# Patient Record
Sex: FEMALE | NOT HISPANIC OR LATINO | Employment: FULL TIME | URBAN - NONMETROPOLITAN AREA
[De-identification: names, ages, dates, MRNs, and addresses within clinical notes are randomized per-mention and may not be internally consistent; named-entity substitution may affect disease eponyms.]

---

## 2022-02-28 DIAGNOSIS — M25.562 LEFT KNEE PAIN, UNSPECIFIED CHRONICITY: Primary | ICD-10-CM

## 2022-02-28 PROBLEM — N76.0 BACTERIAL VAGINOSIS: Status: ACTIVE | Noted: 2022-02-28

## 2022-02-28 PROBLEM — N39.0 URINARY TRACT INFECTIOUS DISEASE: Status: ACTIVE | Noted: 2022-02-28

## 2022-02-28 PROBLEM — H60.90 OTITIS EXTERNA: Status: ACTIVE | Noted: 2022-02-28

## 2022-02-28 PROBLEM — R10.9 ABDOMINAL PAIN: Status: ACTIVE | Noted: 2022-02-28

## 2022-02-28 PROBLEM — K22.2 STRICTURE OF ESOPHAGUS: Status: ACTIVE | Noted: 2022-02-28

## 2022-02-28 PROBLEM — H66.92 ACUTE LEFT OTITIS MEDIA: Status: ACTIVE | Noted: 2022-02-28

## 2022-02-28 PROBLEM — M54.50 LOW BACK PAIN: Status: ACTIVE | Noted: 2022-02-28

## 2022-02-28 PROBLEM — B96.81 GASTRITIS DUE TO HELICOBACTER SPECIES: Status: ACTIVE | Noted: 2022-02-28

## 2022-02-28 PROBLEM — B96.89 BACTERIAL VAGINOSIS: Status: ACTIVE | Noted: 2022-02-28

## 2022-02-28 PROBLEM — E78.00 HYPERCHOLESTEROLEMIA: Status: ACTIVE | Noted: 2022-02-28

## 2022-02-28 PROBLEM — K29.70 GASTRITIS DUE TO HELICOBACTER SPECIES: Status: ACTIVE | Noted: 2022-02-28

## 2022-03-02 ENCOUNTER — OFFICE VISIT (OUTPATIENT)
Dept: ORTHOPEDIC SURGERY | Facility: CLINIC | Age: 47
End: 2022-03-02

## 2022-03-02 ENCOUNTER — HOSPITAL ENCOUNTER (OUTPATIENT)
Dept: GENERAL RADIOLOGY | Facility: HOSPITAL | Age: 47
Discharge: HOME | End: 2022-03-02
Admitting: PHYSICIAN ASSISTANT

## 2022-03-02 VITALS — TEMPERATURE: 98.6 F | BODY MASS INDEX: 31.65 KG/M2 | WEIGHT: 190 LBS | HEIGHT: 65 IN

## 2022-03-02 DIAGNOSIS — G89.29 CHRONIC PAIN OF LEFT KNEE: Primary | ICD-10-CM

## 2022-03-02 DIAGNOSIS — M25.562 CHRONIC PAIN OF LEFT KNEE: Primary | ICD-10-CM

## 2022-03-02 DIAGNOSIS — M25.562 LEFT KNEE PAIN, UNSPECIFIED CHRONICITY: ICD-10-CM

## 2022-03-02 PROCEDURE — 73562 X-RAY EXAM OF KNEE 3: CPT

## 2022-03-02 PROCEDURE — 99204 OFFICE O/P NEW MOD 45 MIN: CPT | Performed by: PHYSICIAN ASSISTANT

## 2022-03-02 RX ORDER — MELOXICAM 15 MG/1
TABLET ORAL
Qty: 90 TABLET | Refills: 1 | Status: SHIPPED | OUTPATIENT
Start: 2022-03-02

## 2022-03-02 NOTE — PROGRESS NOTES
"Chief Complaint  Pain and Establish Care of the Left Knee    Subjective    History of Present Illness      Kymberly Terry is a 46 y.o. female who presents to [facility] for complaint of Knee Pain:   Patient complains of left knee pain.   The pain began 7 months ago.   The pain is located over medial, patellar aspect.    She states the pain started when she was stepping out of the bus ([employer]) and twisted wrong.  She describes the symptoms as stabbing.   Symptoms improve with rest.   She is experiencing swelling, primarily in the back of the knee.  The symptoms are worse with standing and walking.   The knee has given out or felt unstable.   The patient can bend and straighten the knee fully but it is painful.    She has been using ibuprofen for the last month with some relief but she is taking it every 4-6 hours.  She did initially see Dr. Durham but he wanted her to do PT and did not offer much else.      Objective   Vital Signs:   Temp 98.6 °F (37 °C)   Ht 165.1 cm (65\")   Wt 86.2 kg (190 lb)   BMI 31.62 kg/m²     Physical Exam  Vitals signs and nursing note reviewed.   Constitutional:       Appearance: Normal appearance.   Pulmonary:      Effort: Pulmonary effort is normal.   Skin:     General: Skin is warm and dry.      Capillary Refill: Capillary refill takes less than 2 seconds.   Neurological:      General: No focal deficit present.      Mental Status: He is alert and oriented to person, place, and time. Mental status is at baseline.   Psychiatric:         Mood and Affect: Mood normal.         Behavior: Behavior normal.         Thought Content: Thought content normal.         Judgment: Judgment normal.     Ortho Exam   LEFT knee  Positive for effusion of the knee joint and tenderness with palpation of the medial meniscus.        Positive for pain with full extension.  Positive for mild effusion.  Range of motion-extension to flexion: 0-110 degrees.  Positive Apley's grinding test " over the joint line.   Positive Akanksha's.   Negative for instability on medial or lateral testing.   Anterior and posterior drawer testing is negative.   Lachman test is negative.  The dorsalis pedis and posterior tibial artery pulses are palpable. Common peroneal nerve function is well preserved.  Gait is cautious and somewhat antalgic.       Result Review :   Radiologic studies - see below for interpretation  LEFT knee xrays  weightbearing/standing 3 views were ordered by Kishan Ball PA-C. Performed at [facility] on 3/2/2022. Images were independently viewed and interpreted by myself, my impression as follows:  Findings: Mild medial compartment and patellofemoral arthrosis, lateral compartment affect appears fairly preserved.  There is a metallic foreign body noted at the distal thigh, this is linear and just superior to the femur  Bony lesion: no  Soft tissues: increased  Joint spaces: subnormal  Hardware appropriately positioned: not applicable  Prior studies available for comparison: yes           PROCEDURE  Procedures           Assessment   Assessment and Plan    Problem List Items Addressed This Visit        Musculoskeletal and Injuries    Chronic pain of left knee - Primary    Relevant Medications    meloxicam (MOBIC) 15 MG tablet    Other Relevant Orders    MRI Knee Left Without Contrast          Follow Up   · Discussion of any imaging in detail. Discussion of orthopaedic goals.  · Risk, benefits, and merits of treatment alternatives reviewed with the patient. Treatment alternatives include: intra-articular steroid injection, bracing and further imaging/testing.  We will proceed with bracing but will hold off with any type of injection in case MRI shows something surgical.  I am most interested in seeing if there is a meniscal injury at the medial aspect of the knee and the cartilage of the knee cap.  · Ice, heat, and/or modalities as beneficial  · To schedule MRI of Left  knee to evaluate the articular cartilage and to evaluate for possible meniscal injury  · Patient is encouraged to call or return for any issues or concerns.  · Hinged knee brace fitted in office today  · Follow up will be based on results of MRI  • Patient was given instructions and counseling regarding her condition or for health maintenance advice. Please see specific information pulled into the AVS if appropriate.     Kishan Ball PA-C   Date of Encounter: 3/2/2022   Electronically signed by Kishan Ball PA-C, 03/02/22, 5:59 AM EST.     EMR Dragon/Transcription disclaimer:  Much of this encounter note is an electronic transcription/translation of spoken language to printed text. The electronic translation of spoken language may permit erroneous, or at times, nonsensical words or phrases to be inadvertently transcribed; Although I have reviewed the note for such errors, some may still exist.

## 2022-03-03 ENCOUNTER — PATIENT ROUNDING (BHMG ONLY) (OUTPATIENT)
Dept: ORTHOPEDIC SURGERY | Facility: CLINIC | Age: 47
End: 2022-03-03

## 2022-03-03 NOTE — PROGRESS NOTES
March 3, 2022    A [employer] message has been sent to the patient for PATIENT ROUNDING with [facility]

## 2022-03-30 ENCOUNTER — HOSPITAL ENCOUNTER (OUTPATIENT)
Dept: MRI IMAGING | Facility: HOSPITAL | Age: 47
End: 2022-03-30

## 2022-04-28 ENCOUNTER — APPOINTMENT (OUTPATIENT)
Dept: MRI IMAGING | Facility: HOSPITAL | Age: 47
End: 2022-04-28

## 2022-08-18 ENCOUNTER — TELEPHONE (OUTPATIENT)
Dept: ORTHOPEDIC SURGERY | Facility: CLINIC | Age: 47
End: 2022-08-18

## 2022-08-18 DIAGNOSIS — G89.29 CHRONIC PAIN OF LEFT KNEE: Primary | ICD-10-CM

## 2022-08-18 DIAGNOSIS — M25.562 LEFT KNEE PAIN, UNSPECIFIED CHRONICITY: ICD-10-CM

## 2022-08-18 DIAGNOSIS — M25.562 CHRONIC PAIN OF LEFT KNEE: Primary | ICD-10-CM

## 2022-08-18 NOTE — TELEPHONE ENCOUNTER
Caller: BENJI WADE    Relationship: SELF    Best call back number:[phone]    What orders are you requesting (i.e. lab or imaging): MRI OF LEFT KNEE    In what timeframe would the patient need to come in: ASAP    Where will you receive your lab/imaging services: ???    Additional notes: PATIENT NEEDS TO REQ NEW ORDERS FOR MRI- HOSPITAL IS NOT ABLE TO LOCATE IN SYSTEM DUE TO TIME FRAME OF ORIGINAL

## 2022-08-25 NOTE — TELEPHONE ENCOUNTER
PATIENT CALLED BACK REGARDING MRI ORDER.  SHE SPOKE WITH THE HOSPITAL AND THE CURRENT ORDER FROM [facility] CANCELED.  PLEASE ENTER NEW ORDER FOR MRI

## 2022-09-09 ENCOUNTER — HOSPITAL ENCOUNTER (OUTPATIENT)
Dept: MRI IMAGING | Facility: HOSPITAL | Age: 47
Discharge: HOME | End: 2022-09-09
Admitting: PHYSICIAN ASSISTANT

## 2022-09-09 DIAGNOSIS — M25.562 LEFT KNEE PAIN, UNSPECIFIED CHRONICITY: ICD-10-CM

## 2022-09-09 PROCEDURE — 73721 MRI JNT OF LWR EXTRE W/O DYE: CPT

## 2022-09-12 ENCOUNTER — TELEPHONE (OUTPATIENT)
Dept: ORTHOPEDIC SURGERY | Facility: CLINIC | Age: 47
End: 2022-09-12

## 2022-09-13 DIAGNOSIS — G89.29 CHRONIC PAIN OF LEFT KNEE: Primary | ICD-10-CM

## 2022-09-13 DIAGNOSIS — M25.562 CHRONIC PAIN OF LEFT KNEE: Primary | ICD-10-CM

## 2022-09-21 ENCOUNTER — TRANSCRIBE ORDERS (OUTPATIENT)
Dept: ADMINISTRATIVE | Facility: HOSPITAL | Age: 47
End: 2022-09-21

## 2022-09-21 DIAGNOSIS — M25.562 CHRONIC PAIN OF LEFT KNEE: Primary | ICD-10-CM

## 2022-09-21 DIAGNOSIS — G89.29 CHRONIC PAIN OF LEFT KNEE: Primary | ICD-10-CM

## 2022-09-28 ENCOUNTER — HOSPITAL ENCOUNTER (OUTPATIENT)
Dept: CT IMAGING | Facility: HOSPITAL | Age: 47
Discharge: HOME | End: 2022-09-28

## 2022-09-28 ENCOUNTER — HOSPITAL ENCOUNTER (OUTPATIENT)
Dept: INTERVENTIONAL RADIOLOGY/VASCULAR | Facility: HOSPITAL | Age: 47
Discharge: HOME | End: 2022-09-28

## 2022-09-28 DIAGNOSIS — M25.562 CHRONIC PAIN OF LEFT KNEE: ICD-10-CM

## 2022-09-28 DIAGNOSIS — G89.29 CHRONIC PAIN OF LEFT KNEE: ICD-10-CM

## 2022-09-28 PROCEDURE — 77002 NEEDLE LOCALIZATION BY XRAY: CPT

## 2022-09-28 PROCEDURE — 25010000002 IOPAMIDOL 61 % SOLUTION: Performed by: ORTHOPAEDIC SURGERY

## 2022-09-28 PROCEDURE — 73701 CT LOWER EXTREMITY W/DYE: CPT

## 2022-09-28 RX ORDER — LIDOCAINE HYDROCHLORIDE 20 MG/ML
20 INJECTION, SOLUTION INFILTRATION; PERINEURAL ONCE
Status: COMPLETED | OUTPATIENT
Start: 2022-09-28 | End: 2022-09-28

## 2022-09-28 RX ORDER — SODIUM CHLORIDE 9 MG/ML
10 INJECTION INTRAVENOUS
Status: COMPLETED | OUTPATIENT
Start: 2022-09-28 | End: 2022-09-28

## 2022-09-28 RX ADMIN — IOPAMIDOL 15 ML: 612 INJECTION, SOLUTION INTRATHECAL at 11:20

## 2022-09-28 RX ADMIN — LIDOCAINE HYDROCHLORIDE 9 ML: 20 INJECTION, SOLUTION INFILTRATION; PERINEURAL at 11:15

## 2022-09-28 RX ADMIN — SODIUM BICARBONATE 1 MEQ: 84 INJECTION, SOLUTION INTRAVENOUS at 11:15

## 2022-09-28 RX ADMIN — SODIUM CHLORIDE 10 ML: 9 INJECTION, SOLUTION INTRAMUSCULAR; INTRAVENOUS; SUBCUTANEOUS at 11:20

## 2022-10-03 ENCOUNTER — OFFICE VISIT (OUTPATIENT)
Dept: ORTHOPEDIC SURGERY | Facility: CLINIC | Age: 47
End: 2022-10-03

## 2022-10-03 VITALS — WEIGHT: 209.4 LBS | TEMPERATURE: 98.4 F | BODY MASS INDEX: 33.65 KG/M2 | HEIGHT: 66 IN

## 2022-10-03 DIAGNOSIS — M17.12 PRIMARY OSTEOARTHRITIS OF LEFT KNEE: Primary | ICD-10-CM

## 2022-10-03 PROCEDURE — 99213 OFFICE O/P EST LOW 20 MIN: CPT | Performed by: ORTHOPAEDIC SURGERY

## 2022-10-03 NOTE — PROGRESS NOTES
"Chief Complaint  Follow-up of the Left Knee    Subjective    History of Present Illness      Kymberly Terry is a 47 y.o. female who presents to [facility] for persistent left knee pain and discomfort.  History of Present Illness this patient states that she has done a little bit better since the last visit to the office.  Most of the pain is on the medial aspect of the knee.  She has difficulty squatting on the ground and difficulty going up and down the steps.  Occasionally by the end of the day she does have effusion and swelling of the knee.  She would like to try conservative, nonoperative management at this point and I certainly agree with trying steroid injections and even viscosupplementation prior to any type of surgical replacement of the knee because she is only 47 years of age.  Pain Location:  LEFT knee  Radiation: none  Quality: dull  Intensity/Severity: moderate  Duration: August 2021  Progression of symptoms: no worsening, symptoms stable/unchanged  Onset quality: sudden, she was stepping out of the bus ([employer]) when she twisted wrong   Timing: intermittent  Aggravating Factors: walking, standing  Alleviating Factors: NSAIDs, rest, brace  Previous Episodes: yes  Associated Symptoms: pain, swelling, clicking/popping  ADLs Affected: ambulating  Previous Treatment: NSAIDs       Objective   Vital Signs:   Temp 98.4 °F (36.9 °C)   Ht 167.6 cm (66\")   Wt 95 kg (209 lb 6.4 oz)   BMI 33.80 kg/m²     Physical Exam  Physical Exam  Vitals signs and nursing note reviewed.   Constitutional:       Appearance: Normal appearance.   Pulmonary:      Effort: Pulmonary effort is normal.   Skin:     General: Skin is warm and dry.      Capillary Refill: Capillary refill takes less than 2 seconds.   Neurological:      General: No focal deficit present.      Mental Status: He is alert and oriented to person, place, and time. Mental status is at baseline.   Psychiatric:         Mood and Affect: " Mood normal.         Behavior: Behavior normal.         Thought Content: Thought content normal.         Judgment: Judgment normal.     Ortho Exam   Left knee (varus). Patient has crepitus throughout range of motion. Positive patellar grind test. Mild effusion. Lachman is negative. Pivot shift is negative. Anterior and posterior drawer signs are negative. Significant joint line tenderness is noted on the medial aspect of the knee. Patient has a varus orientation of the knee. There is fullness and tenderness in the Popliteal fossa. Mild distention of a Popliteal cyst is noted in this location. Range of motion in flexion is from 0-110 degrees. Neurovascular status is intact.  Dorsalis pedis and posterior tibial artery pulses are palpable. Common peroneal nerve function is well preserved. Patient's gait is cautious and antalgic. Skin and soft tissues are mildly swollen, consistent with synovitis and effusion. The patient has a significant limp with the first few steps after starting the gait cycle. Getting out of a chair takes a lot of effort due to pain on knee flexion.         Result Review :   The following data was reviewed by: Bean Turpin MD on 10/03/2022:  Radiologic studies - see below for interpretation  Reviewed CT Arthrogram report of left knee, performed at [facility] on 09/28/2022, summary of impression below:    Findings of the CT arthrogram are available in the office today.  These images are discussed with the patient.  No meniscal tear is identified.  There is grade III chondromalacia along the weightbearing surface of the lateral femoral condyle.  There is also grade III chondromalacia along the weightbearing aspect of the medial femoral condyle.  The ACL appears to be intact.  The osteoarthritic changes are noted along the superior and inferior pole of the patella.  There is a 4.2 x 2.8 cm popliteal cyst noted.        Procedures           Assessment   Assessment and Plan     Diagnoses and all orders for this visit:    1. Primary osteoarthritis of left knee (Primary)  -     Visco Treatment; Future          Follow Up   · Compression/brace of the knee to prevent it from buckling and giving her.  · Calcium and vitamin D for bone health.  · Glucosamine, chondroitin and turmeric for cartilage health.  · Intra-articular steroid injections and viscosupplementation injections discussed with the patient at length and she understands and accepts that.  We will have to see if the insurance company will approve those injections.  · Tablet meloxicam 7.5 mg tab 1 p.o. nightly for pain swelling and discomfort.  · Rest, ice, compression, and elevation (RICE) therapy  · Stretching and strengthening exercises  · OTC Alternate Ibuprofen and Tylenol as needed  · Follow up in 4 week(s) or once Visco Supplementation has been approved by the insurance company   • Patient was given instructions and counseling regarding her condition or for health maintenance advice. Please see specific information pulled into the AVS if appropriate.     Bean Turpin MD   Date of Encounter: 10/3/2022   Electronically signed by Bean Turpin MD, 10/03/22, 9:20 AM EDT.     EMR Dragon/Transcription disclaimer:  Much of this encounter note is an electronic transcription/translation of spoken language to printed text. The electronic translation of spoken language may permit erroneous, or at times, nonsensical words or phrases to be inadvertently transcribed; Although I have reviewed the note for such errors, some may still exist.

## 2022-10-07 ENCOUNTER — TELEPHONE (OUTPATIENT)
Dept: ORTHOPEDIC SURGERY | Facility: CLINIC | Age: 47
End: 2022-10-07

## 2022-10-07 NOTE — TELEPHONE ENCOUNTER
Caller: BENJI    Relationship to patient: SELF     Best call back number: [phone]    Patient is needing: PT CALLED IN TO SEE IF OFFICE WAS ABLE TO GET AUTH FOR GEL INJECTIONS FOR L KNEE , SHE ALSO STATED IF INSURANCE DOES NOT AUTH THIS THAT SHE WOULD LIKE TO KNOW HOW MUCH IT IS OUT OF POCKET. PLEASE ADVISE

## 2022-10-10 NOTE — TELEPHONE ENCOUNTER
Spoke to pt. She is interested in information about the discount pharmacy, I will get info for her.

## 2022-10-15 PROBLEM — M17.12 PRIMARY OSTEOARTHRITIS OF LEFT KNEE: Status: ACTIVE | Noted: 2022-10-15

## 2024-04-26 ENCOUNTER — OFFICE VISIT (OUTPATIENT)
Dept: SURGERY | Facility: CLINIC | Age: 49
End: 2024-04-26

## 2024-04-26 VITALS
HEART RATE: 111 BPM | DIASTOLIC BLOOD PRESSURE: 98 MMHG | SYSTOLIC BLOOD PRESSURE: 142 MMHG | WEIGHT: 210.8 LBS | BODY MASS INDEX: 33.88 KG/M2 | HEIGHT: 66 IN

## 2024-04-26 DIAGNOSIS — L02.416 ABSCESS OF LEFT THIGH: Primary | ICD-10-CM

## 2024-04-26 DIAGNOSIS — Z72.0 TOBACCO USE: ICD-10-CM

## 2024-04-26 LAB — GRAM STN SPEC: NORMAL

## 2024-04-26 PROCEDURE — 87205 SMEAR GRAM STAIN: CPT | Performed by: STUDENT IN AN ORGANIZED HEALTH CARE EDUCATION/TRAINING PROGRAM

## 2024-04-26 RX ORDER — METFORMIN HYDROCHLORIDE 500 MG/1
500 TABLET, EXTENDED RELEASE ORAL
COMMUNITY
Start: 2024-04-18

## 2024-04-26 RX ORDER — CEPHALEXIN 500 MG/1
500 CAPSULE ORAL 3 TIMES DAILY
COMMUNITY
Start: 2024-04-18

## 2024-04-26 RX ORDER — SULFAMETHOXAZOLE AND TRIMETHOPRIM 800; 160 MG/1; MG/1
1 TABLET ORAL 2 TIMES DAILY
Qty: 14 TABLET | Refills: 0 | Status: SHIPPED | OUTPATIENT
Start: 2024-04-26

## 2024-04-26 NOTE — PROGRESS NOTES
Patient Name:  Kymberly Terry  YOB: 1975  [id number]    Referring Provider: Silver Ruelas*    Patient Care Team:  Jennifer Ruelas APRN as PCP - General (Nurse Practitioner)      Chief Complaint  Cyst (LEFT INNER THIGH)    Subjective     Kymberly Terry is a 49 y.o. female who presents to [facility]    History of Present Illness  49-year-old female who presents today as a new referral for an inner left thigh abscess.  Patient states she first noticed the lesion over 1 week ago and had progressively gotten larger.  She was evaluated by her primary care provider who prescribed her an outpatient course of Keflex which she says has not significantly helped the pain she has had but she thinks the lesion has gotten somewhat smaller.  In the past she has had similar appearing lesions that have spontaneously drained and resolved without intervention.  She denies any nausea, vomiting, fevers, chills.  Cyst        History     Past Medical History:   Diagnosis Date    Diabetes mellitus     Knee swelling 12/19    Tear of meniscus of knee Surgery on right January 2020 for torn meniscus       Past Surgical History:   Procedure Laterality Date    APPENDECTOMY  2008    DR. THOMAS/DAVID    GALLBLADDER SURGERY  2000    DR. DEL ANGEL/LEBANON    HYSTERECTOMY  11/2006    PARTIAL/DR. ROTH/ROLANDO    KNEE ARTHROSCOPY Right 2020    DR. HINTON/DAVID    LAPAROSCOPIC TUBAL LIGATION  11/2006    DR. ROTH/ROLANDO       Family History   Problem Relation Age of Onset    Diabetes Mother     Lung cancer Father        Social History     Tobacco Use    Smoking status: Every Day     Current packs/day: 1.50     Average packs/day: 1.5 packs/day for 25.0 years (37.5 ttl pk-yrs)     Types: Cigarettes    Smokeless tobacco: Never   Vaping Use    Vaping status: Never Used   Substance Use Topics    Alcohol use: Never    Drug use: Never       No Known  "Allergies    Prior to Admission medications    Medication Sig Start Date End Date Taking? Authorizing Provider   cephalexin (KEFLEX) 500 MG capsule Take 1 capsule by mouth 3 (Three) Times a Day. 4/18/24  Yes Provider, MD Moo   metFORMIN ER (GLUCOPHAGE-XR) 500 MG 24 hr tablet Take 1 tablet by mouth Daily With Breakfast. 4/18/24  Yes Provider, MD Moo   meloxicam (MOBIC) 15 MG tablet 1 PO Daily with food.  Patient not taking: Reported on 4/26/2024 3/2/22   Kishan Ball PA-C   sulfamethoxazole-trimethoprim (Bactrim DS) 800-160 MG per tablet Take 1 tablet by mouth 2 (Two) Times a Day. 4/26/24   Chris Esparza MD       Objective    Objective              Vital Signs:   /98 (BP Location: Left arm, Patient Position: Sitting, Cuff Size: Adult)   Pulse 111   Ht 167.6 cm (66\")   Wt 95.6 kg (210 lb 12.8 oz)   BMI 34.02 kg/m²       Physical Exam  Exam conducted with a chaperone present.   Constitutional:       Appearance: Normal appearance.   HENT:      Head: Normocephalic and atraumatic.      Mouth/Throat:      Mouth: Mucous membranes are moist.      Pharynx: Oropharynx is clear.   Cardiovascular:      Rate and Rhythm: Normal rate and regular rhythm.   Pulmonary:      Effort: Pulmonary effort is normal. No respiratory distress.   Abdominal:      General: There is no distension.      Palpations: Abdomen is soft.      Tenderness: There is no abdominal tenderness.   Musculoskeletal:         General: No swelling. Normal range of motion.      Cervical back: Normal range of motion and neck supple.      Comments: Approximately 4 cm inner left thigh abscess with fluctuance and surrounding erythema   Skin:     General: Skin is warm and dry.   Neurological:      General: No focal deficit present.      Mental Status: She is alert and oriented to person, place, and time.   Psychiatric:         Mood and Affect: Mood normal.         Behavior: Behavior normal.          Incision & Drainage    Date/Time: " 4/26/2024 3:25 PM    Performed by: Chris Esparza MD  Authorized by: Chris Esparza MD  Type: abscess  Body area: lower extremity  Location details: left leg    Anesthesia:  Local Anesthetic: lidocaine 1% with epinephrine  Anesthetic total: 10 mL    Sedation:  Patient sedated: no    Scalpel size: 15  Incision type: Cruciate.  Drainage: purulent  Drainage amount: moderate  Wound treatment: wound left open  Packing material: 1/2 in gauze  Patient tolerance: patient tolerated the procedure well with no immediate complications           Assessment / Plan      Diagnoses and all orders for this visit:    1. Abscess of left thigh (Primary)  -     sulfamethoxazole-trimethoprim (Bactrim DS) 800-160 MG per tablet; Take 1 tablet by mouth 2 (Two) Times a Day.  Dispense: 14 tablet; Refill: 0  -     Wound Culture - Wound, Thigh, Left    2. Tobacco use    Other orders  -     Cancel: Validation General Procedure  -     Incision & Drainage    49-year-old female with intermittent left thigh abscess.  Plan for incision and drainage today in the office, please see above procedure note.  Wound culture obtained.  Change oral antibiotic regimen to Bactrim.  Wound care instructions provided.  Plan for follow-up with me for a wound check in 2 weeks.      Follow Up   Return in about 2 weeks (around 5/10/2024) for wound check.      Patient was given instructions and counseling regarding her condition or for health maintenance advice. Please see specific information pulled into the AVS if appropriate.     Electronically signed by Chris Esparza MD, 04/26/24, 3:23 PM EDT.

## 2024-05-03 LAB
BACTERIA SPEC AEROBE CULT: ABNORMAL
BACTERIA SPEC AEROBE CULT: ABNORMAL
GRAM STN SPEC: ABNORMAL

## 2024-05-16 ENCOUNTER — TELEPHONE (OUTPATIENT)
Dept: SURGERY | Facility: CLINIC | Age: 49
End: 2024-05-16